# Patient Record
Sex: FEMALE | Race: WHITE | Employment: UNEMPLOYED | URBAN - METROPOLITAN AREA
[De-identification: names, ages, dates, MRNs, and addresses within clinical notes are randomized per-mention and may not be internally consistent; named-entity substitution may affect disease eponyms.]

---

## 2018-12-14 ENCOUNTER — APPOINTMENT (OUTPATIENT)
Dept: GENERAL RADIOLOGY | Age: 28
DRG: 071 | End: 2018-12-14
Payer: MEDICARE

## 2018-12-14 ENCOUNTER — HOSPITAL ENCOUNTER (INPATIENT)
Age: 28
LOS: 5 days | Discharge: HOME HEALTH | DRG: 071 | End: 2018-12-19
Attending: EMERGENCY MEDICINE | Admitting: HOSPITALIST
Payer: MEDICARE

## 2018-12-14 ENCOUNTER — APPOINTMENT (OUTPATIENT)
Dept: CT IMAGING | Age: 28
DRG: 071 | End: 2018-12-14
Payer: MEDICARE

## 2018-12-14 DIAGNOSIS — R90.89 ABNORMAL BRAIN CT: ICD-10-CM

## 2018-12-14 DIAGNOSIS — R41.82 ALTERED MENTAL STATUS, UNSPECIFIED ALTERED MENTAL STATUS TYPE: Primary | ICD-10-CM

## 2018-12-14 PROBLEM — G93.40 ACUTE ENCEPHALOPATHY: Status: ACTIVE | Noted: 2018-12-14

## 2018-12-14 LAB
A/G RATIO: 1.1 (ref 1.1–2.2)
ACETAMINOPHEN LEVEL: <5 UG/ML (ref 10–30)
ALBUMIN SERPL-MCNC: 4.3 G/DL (ref 3.4–5)
ALP BLD-CCNC: 86 U/L (ref 40–129)
ALT SERPL-CCNC: 24 U/L (ref 10–40)
AMMONIA: 67 UMOL/L (ref 11–51)
AMPHETAMINE SCREEN, URINE: NORMAL
ANION GAP SERPL CALCULATED.3IONS-SCNC: 12 MMOL/L (ref 3–16)
AST SERPL-CCNC: 16 U/L (ref 15–37)
BACTERIA: ABNORMAL /HPF
BARBITURATE SCREEN URINE: NORMAL
BASE EXCESS VENOUS: -5.1 MMOL/L (ref -3–3)
BASOPHILS ABSOLUTE: 0.1 K/UL (ref 0–0.2)
BASOPHILS RELATIVE PERCENT: 0.7 %
BENZODIAZEPINE SCREEN, URINE: NORMAL
BILIRUB SERPL-MCNC: <0.2 MG/DL (ref 0–1)
BILIRUBIN URINE: NEGATIVE
BLOOD, URINE: NEGATIVE
BUN BLDV-MCNC: 9 MG/DL (ref 7–20)
CALCIUM SERPL-MCNC: 9 MG/DL (ref 8.3–10.6)
CANNABINOID SCREEN URINE: NORMAL
CARBOXYHEMOGLOBIN: 2.5 % (ref 0–1.5)
CHLORIDE BLD-SCNC: 106 MMOL/L (ref 99–110)
CLARITY: CLEAR
CO2: 19 MMOL/L (ref 21–32)
COCAINE METABOLITE SCREEN URINE: NORMAL
COLOR: YELLOW
CREAT SERPL-MCNC: 0.8 MG/DL (ref 0.6–1.1)
D DIMER: <200 NG/ML DDU (ref 0–229)
EKG ATRIAL RATE: 111 BPM
EKG DIAGNOSIS: NORMAL
EKG P AXIS: 32 DEGREES
EKG P-R INTERVAL: 170 MS
EKG Q-T INTERVAL: 322 MS
EKG QRS DURATION: 82 MS
EKG QTC CALCULATION (BAZETT): 437 MS
EKG R AXIS: 23 DEGREES
EKG T AXIS: 52 DEGREES
EKG VENTRICULAR RATE: 111 BPM
EOSINOPHILS ABSOLUTE: 0.1 K/UL (ref 0–0.6)
EOSINOPHILS RELATIVE PERCENT: 0.4 %
EPITHELIAL CELLS, UA: 1 /HPF (ref 0–5)
ETHANOL: NORMAL MG/DL (ref 0–0.08)
GFR AFRICAN AMERICAN: >60
GFR NON-AFRICAN AMERICAN: >60
GLOBULIN: 3.9 G/DL
GLUCOSE BLD-MCNC: 122 MG/DL (ref 70–99)
GLUCOSE BLD-MCNC: 145 MG/DL (ref 70–99)
GLUCOSE BLD-MCNC: 93 MG/DL (ref 70–99)
GLUCOSE URINE: NEGATIVE MG/DL
HCG QUALITATIVE: NEGATIVE
HCO3 VENOUS: 19.9 MMOL/L (ref 23–29)
HCT VFR BLD CALC: 42.6 % (ref 36–48)
HEMOGLOBIN, VEN, REDUCED: 11 %
HEMOGLOBIN: 14.1 G/DL (ref 12–16)
HYALINE CASTS: 0 /LPF (ref 0–8)
INR BLD: 1.25 (ref 0.86–1.14)
KETONES, URINE: NEGATIVE MG/DL
LACTIC ACID: 1.3 MMOL/L (ref 0.4–2)
LEUKOCYTE ESTERASE, URINE: ABNORMAL
LITHIUM DOSE AMOUNT: ABNORMAL
LITHIUM LEVEL: <0.1 MMOL/L (ref 0.6–1.2)
LYMPHOCYTES ABSOLUTE: 2.6 K/UL (ref 1–5.1)
LYMPHOCYTES RELATIVE PERCENT: 18.7 %
Lab: NORMAL
MCH RBC QN AUTO: 29.1 PG (ref 26–34)
MCHC RBC AUTO-ENTMCNC: 33 G/DL (ref 31–36)
MCV RBC AUTO: 88.1 FL (ref 80–100)
METHADONE SCREEN, URINE: NORMAL
METHEMOGLOBIN VENOUS: 0.3 %
MICROSCOPIC EXAMINATION: YES
MONOCYTES ABSOLUTE: 0.9 K/UL (ref 0–1.3)
MONOCYTES RELATIVE PERCENT: 6.6 %
NEUTROPHILS ABSOLUTE: 10.1 K/UL (ref 1.7–7.7)
NEUTROPHILS RELATIVE PERCENT: 73.6 %
NITRITE, URINE: NEGATIVE
O2 CONTENT, VEN: 17 VOL %
O2 SAT, VEN: 89 %
O2 THERAPY: ABNORMAL
OPIATE SCREEN URINE: NORMAL
OXYCODONE URINE: NORMAL
PCO2, VEN: 36.2 MMHG (ref 40–50)
PDW BLD-RTO: 13.8 % (ref 12.4–15.4)
PERFORMED ON: ABNORMAL
PERFORMED ON: NORMAL
PH UA: 5
PH UA: 6
PH VENOUS: 7.35 (ref 7.35–7.45)
PHENCYCLIDINE SCREEN URINE: NORMAL
PLATELET # BLD: 254 K/UL (ref 135–450)
PMV BLD AUTO: 7.8 FL (ref 5–10.5)
PO2, VEN: 53.2 MMHG (ref 25–40)
POTASSIUM SERPL-SCNC: 4.2 MMOL/L (ref 3.5–5.1)
PRO-BNP: 7 PG/ML (ref 0–124)
PROPOXYPHENE SCREEN: NORMAL
PROTEIN UA: NEGATIVE MG/DL
PROTHROMBIN TIME: 14.2 SEC (ref 9.8–13)
RBC # BLD: 4.84 M/UL (ref 4–5.2)
RBC UA: 1 /HPF (ref 0–4)
SODIUM BLD-SCNC: 137 MMOL/L (ref 136–145)
SPECIFIC GRAVITY UA: 1.01
TCO2 CALC VENOUS: 47 MMOL/L
TOTAL PROTEIN: 8.2 G/DL (ref 6.4–8.2)
URINE REFLEX TO CULTURE: YES
URINE TYPE: ABNORMAL
UROBILINOGEN, URINE: 0.2 E.U./DL
VALPROIC ACID LEVEL: <2.8 UG/ML (ref 50–100)
WBC # BLD: 13.8 K/UL (ref 4–11)
WBC UA: 2 /HPF (ref 0–5)

## 2018-12-14 PROCEDURE — G0480 DRUG TEST DEF 1-7 CLASSES: HCPCS

## 2018-12-14 PROCEDURE — 2580000003 HC RX 258: Performed by: HOSPITALIST

## 2018-12-14 PROCEDURE — 87801 DETECT AGNT MULT DNA AMPLI: CPT

## 2018-12-14 PROCEDURE — 83880 ASSAY OF NATRIURETIC PEPTIDE: CPT

## 2018-12-14 PROCEDURE — 71045 X-RAY EXAM CHEST 1 VIEW: CPT

## 2018-12-14 PROCEDURE — 80053 COMPREHEN METABOLIC PANEL: CPT

## 2018-12-14 PROCEDURE — 96374 THER/PROPH/DIAG INJ IV PUSH: CPT

## 2018-12-14 PROCEDURE — 80164 ASSAY DIPROPYLACETIC ACD TOT: CPT

## 2018-12-14 PROCEDURE — 82803 BLOOD GASES ANY COMBINATION: CPT

## 2018-12-14 PROCEDURE — 83036 HEMOGLOBIN GLYCOSYLATED A1C: CPT

## 2018-12-14 PROCEDURE — 85610 PROTHROMBIN TIME: CPT

## 2018-12-14 PROCEDURE — 82140 ASSAY OF AMMONIA: CPT

## 2018-12-14 PROCEDURE — 93005 ELECTROCARDIOGRAM TRACING: CPT | Performed by: EMERGENCY MEDICINE

## 2018-12-14 PROCEDURE — 36415 COLL VENOUS BLD VENIPUNCTURE: CPT

## 2018-12-14 PROCEDURE — 81001 URINALYSIS AUTO W/SCOPE: CPT

## 2018-12-14 PROCEDURE — 84703 CHORIONIC GONADOTROPIN ASSAY: CPT

## 2018-12-14 PROCEDURE — 93005 ELECTROCARDIOGRAM TRACING: CPT | Performed by: INTERNAL MEDICINE

## 2018-12-14 PROCEDURE — 84145 PROCALCITONIN (PCT): CPT

## 2018-12-14 PROCEDURE — 80178 ASSAY OF LITHIUM: CPT

## 2018-12-14 PROCEDURE — 70450 CT HEAD/BRAIN W/O DYE: CPT

## 2018-12-14 PROCEDURE — 2580000003 HC RX 258: Performed by: EMERGENCY MEDICINE

## 2018-12-14 PROCEDURE — 96375 TX/PRO/DX INJ NEW DRUG ADDON: CPT

## 2018-12-14 PROCEDURE — 85025 COMPLETE CBC W/AUTO DIFF WBC: CPT

## 2018-12-14 PROCEDURE — 85379 FIBRIN DEGRADATION QUANT: CPT

## 2018-12-14 PROCEDURE — 87040 BLOOD CULTURE FOR BACTERIA: CPT

## 2018-12-14 PROCEDURE — 6370000000 HC RX 637 (ALT 250 FOR IP): Performed by: HOSPITALIST

## 2018-12-14 PROCEDURE — 99285 EMERGENCY DEPT VISIT HI MDM: CPT

## 2018-12-14 PROCEDURE — 80307 DRUG TEST PRSMV CHEM ANLYZR: CPT

## 2018-12-14 PROCEDURE — 2060000000 HC ICU INTERMEDIATE R&B

## 2018-12-14 PROCEDURE — 84443 ASSAY THYROID STIM HORMONE: CPT

## 2018-12-14 PROCEDURE — 87086 URINE CULTURE/COLONY COUNT: CPT

## 2018-12-14 PROCEDURE — 96361 HYDRATE IV INFUSION ADD-ON: CPT

## 2018-12-14 PROCEDURE — 83605 ASSAY OF LACTIC ACID: CPT

## 2018-12-14 PROCEDURE — 96372 THER/PROPH/DIAG INJ SC/IM: CPT

## 2018-12-14 PROCEDURE — 93010 ELECTROCARDIOGRAM REPORT: CPT | Performed by: INTERNAL MEDICINE

## 2018-12-14 RX ORDER — CLOZAPINE 100 MG/1
300 TABLET ORAL DAILY
Status: DISCONTINUED | OUTPATIENT
Start: 2018-12-14 | End: 2018-12-19 | Stop reason: HOSPADM

## 2018-12-14 RX ORDER — BENZTROPINE MESYLATE 1 MG/1
1 TABLET ORAL NIGHTLY
Status: DISCONTINUED | OUTPATIENT
Start: 2018-12-14 | End: 2018-12-19 | Stop reason: HOSPADM

## 2018-12-14 RX ORDER — DEXTROSE MONOHYDRATE 50 MG/ML
100 INJECTION, SOLUTION INTRAVENOUS PRN
Status: DISCONTINUED | OUTPATIENT
Start: 2018-12-14 | End: 2018-12-19 | Stop reason: HOSPADM

## 2018-12-14 RX ORDER — ONDANSETRON 2 MG/ML
4 INJECTION INTRAMUSCULAR; INTRAVENOUS EVERY 6 HOURS PRN
Status: DISCONTINUED | OUTPATIENT
Start: 2018-12-14 | End: 2018-12-19 | Stop reason: HOSPADM

## 2018-12-14 RX ORDER — ATORVASTATIN CALCIUM 10 MG/1
10 TABLET, FILM COATED ORAL DAILY
COMMUNITY

## 2018-12-14 RX ORDER — SODIUM CHLORIDE 0.9 % (FLUSH) 0.9 %
10 SYRINGE (ML) INJECTION PRN
Status: DISCONTINUED | OUTPATIENT
Start: 2018-12-14 | End: 2018-12-19 | Stop reason: HOSPADM

## 2018-12-14 RX ORDER — LEVOTHYROXINE SODIUM 0.07 MG/1
75 TABLET ORAL DAILY
COMMUNITY

## 2018-12-14 RX ORDER — YOHIMBE BARK 500 MG
1 CAPSULE ORAL DAILY
COMMUNITY

## 2018-12-14 RX ORDER — BENZTROPINE MESYLATE 1 MG/1
1 TABLET ORAL NIGHTLY
COMMUNITY

## 2018-12-14 RX ORDER — SODIUM CHLORIDE 0.9 % (FLUSH) 0.9 %
10 SYRINGE (ML) INJECTION EVERY 12 HOURS SCHEDULED
Status: DISCONTINUED | OUTPATIENT
Start: 2018-12-14 | End: 2018-12-19 | Stop reason: HOSPADM

## 2018-12-14 RX ORDER — TRAZODONE HYDROCHLORIDE 100 MG/1
100 TABLET ORAL NIGHTLY
COMMUNITY

## 2018-12-14 RX ORDER — CLINDAMYCIN PHOSPHATE 10 MG/G
GEL TOPICAL 2 TIMES DAILY
Status: ON HOLD | COMMUNITY
End: 2018-12-19 | Stop reason: HOSPADM

## 2018-12-14 RX ORDER — GELATIN CAPSULES (EMPTY)
1 CAPSULE ORAL PRN
Status: DISCONTINUED | OUTPATIENT
Start: 2018-12-14 | End: 2018-12-19 | Stop reason: HOSPADM

## 2018-12-14 RX ORDER — ATORVASTATIN CALCIUM 10 MG/1
10 TABLET, FILM COATED ORAL DAILY
Status: DISCONTINUED | OUTPATIENT
Start: 2018-12-15 | End: 2018-12-19 | Stop reason: HOSPADM

## 2018-12-14 RX ORDER — NICOTINE POLACRILEX 4 MG
15 LOZENGE BUCCAL PRN
Status: DISCONTINUED | OUTPATIENT
Start: 2018-12-14 | End: 2018-12-19 | Stop reason: HOSPADM

## 2018-12-14 RX ORDER — 0.9 % SODIUM CHLORIDE 0.9 %
1000 INTRAVENOUS SOLUTION INTRAVENOUS ONCE
Status: COMPLETED | OUTPATIENT
Start: 2018-12-14 | End: 2018-12-14

## 2018-12-14 RX ORDER — DEXTROSE MONOHYDRATE 25 G/50ML
12.5 INJECTION, SOLUTION INTRAVENOUS PRN
Status: DISCONTINUED | OUTPATIENT
Start: 2018-12-14 | End: 2018-12-19 | Stop reason: HOSPADM

## 2018-12-14 RX ORDER — TOPIRAMATE 100 MG/1
100 TABLET, FILM COATED ORAL 2 TIMES DAILY
Status: DISCONTINUED | OUTPATIENT
Start: 2018-12-14 | End: 2018-12-19 | Stop reason: HOSPADM

## 2018-12-14 RX ORDER — FLUPHENAZINE DECANOATE 25 MG/ML
62.5 INJECTION, SOLUTION INTRAMUSCULAR; SUBCUTANEOUS
COMMUNITY

## 2018-12-14 RX ORDER — LEVOTHYROXINE SODIUM 0.07 MG/1
75 TABLET ORAL DAILY
Status: DISCONTINUED | OUTPATIENT
Start: 2018-12-15 | End: 2018-12-19 | Stop reason: HOSPADM

## 2018-12-14 RX ADMIN — BENZTROPINE MESYLATE 1 MG: 1 TABLET ORAL at 20:47

## 2018-12-14 RX ADMIN — Medication 10 ML: at 20:48

## 2018-12-14 RX ADMIN — INSULIN LISPRO 1 UNITS: 100 INJECTION, SOLUTION INTRAVENOUS; SUBCUTANEOUS at 21:31

## 2018-12-14 RX ADMIN — CLOZAPINE 300 MG: 100 TABLET ORAL at 20:47

## 2018-12-14 RX ADMIN — TOPIRAMATE 100 MG: 100 TABLET, FILM COATED ORAL at 20:47

## 2018-12-14 RX ADMIN — SODIUM CHLORIDE 1000 ML: 9 INJECTION, SOLUTION INTRAVENOUS at 15:47

## 2018-12-14 RX ADMIN — RIVAROXABAN 20 MG: 20 TABLET, FILM COATED ORAL at 20:47

## 2018-12-14 ASSESSMENT — PAIN DESCRIPTION - LOCATION
LOCATION: CHEST

## 2018-12-14 ASSESSMENT — PAIN SCALES - GENERAL
PAINLEVEL_OUTOF10: 9
PAINLEVEL_OUTOF10: 5
PAINLEVEL_OUTOF10: 4
PAINLEVEL_OUTOF10: 0

## 2018-12-14 ASSESSMENT — PAIN DESCRIPTION - PAIN TYPE
TYPE: ACUTE PAIN

## 2018-12-14 ASSESSMENT — PAIN DESCRIPTION - ORIENTATION: ORIENTATION: MID

## 2018-12-14 NOTE — ED NOTES
Attempted PIV x2 and was unsuccessful. Another RN notified to attempt.        Shahab Williamson RN  12/14/18 6839

## 2018-12-14 NOTE — ED PROVIDER NOTES
[facility] Emergency Department      Pt Name: Barrie Schwartz  MRN: [id number]  [place] 1990  Date of evaluation: 12/14/2018  Provider: Galindo Miranda MD  CHIEF COMPLAINT  Chief Complaint   Patient presents with    Altered Mental Status     Pt. comes in by [facility] from a group home for altered mental status. Pt. reports she has had diarrhea the last few days, burning when she pees, and frequency. Pt. also has a history of diabetes. Pt. alert and oriented, but slow to respond. HPI  Barrie Schwartz is a 33 y.o. female who presents because of altered mental state. She does have a history of mental illness and lives in a group home with a history of mild MR. She was at work today when she was sent here by EMS. She denies any overdose. She denies any recent illness. However, she is currently a poor historian and does not seem to be able to effectively answer questions with reliable accuracy. She told the nurse that she has burning when she urinates with frequency. She agrees that she has noticed that. She mentioned chest discomfort to the nurse. She does not mention that to me. She denies any alcohol use or use of illicit substances. She is on multiple medicines that could have a sedative effect. She reports having diarrhea for about 2 months. REVIEW OF SYSTEMS:  See HPI for further details. Remainder of review of systems limited by patient ability to provide history. Nursing notes reviewed.     PAST MEDICAL HISTORY  Past Medical History:   Diagnosis Date    Arthritis     R wrist    Asthma     Bipolar affective disorder ([address]     Blood circulation, collateral     Borderline personality disorder ([address]     Diabetes mellitus ([address]     GERD (gastroesophageal reflux disease)     Mental retardation     Obesity     Other disorders of kidney and ureter     Pulmonary embolism (HCC)     Seizures (HCC)     last sz 2 wks ago    Thyroid disease      SURGICAL °C), temperature source Oral, resp. rate 20, height 5' 9\" (1.753 m), weight 300 lb (136.1 kg), SpO2 97 %. Constitutional:  33 y.o. female eyes open, appears to be altered but will answer questions slowly  HENT:  Atraumatic, mucous membranes moist  Eyes:   Conjunctiva clear, no icterus  Neck:  Supple, no JVD, no signs of injury  Cardiovascular:  Regular, no rubs, no discernible murmur, mild tachycardia  Thorax & Lungs:  No accessory muscle usage, clear  Abdomen:  Soft, non distended, bowel sounds present, NT  Back:  No deformity  Genitalia:  Deferred  Rectal:  Deferred  Extremities:  No cyanosis, no edema  Skin:  Warm, dry  Neurologic:  Alert, no slurred speech, no focal deficits noted, light touch sensation intact  Psychiatric:  Affect appropriate    DIAGNOSTIC RESULTS:  Labs resulted at the time of this note reviewed.   Labs Reviewed   CBC WITH AUTO DIFFERENTIAL - Abnormal; Notable for the following:        Result Value    WBC 13.8 (*)     Neutrophils # 10.1 (*)     All other components within normal limits    Narrative:     Performed at:  [facility] [address], [place], [address]   Phone [phone]   COMPREHENSIVE METABOLIC PANEL - Abnormal; Notable for the following:     CO2 19 (*)     Glucose 122 (*)     All other components within normal limits    Narrative:     Performed at:  [facility] [address], [place], [facility] [employer] [address]   Phone [phone]   LITHIUM LEVEL - Abnormal; Notable for the following:     Lithium Lvl <0.1 (*)     All other components within normal limits    Narrative:     Performed at:  [facility] [address], [place], [facility] [employer] [address]   Phone [phone]   BLOOD GAS, VENOUS - Abnormal; Notable for the following:     pH, Amrik 7.348 (*)     pCO2, Amrik 36.2 (*)     pO2, Amrik 53.2 (*)     HCO3, Venous 19.9 (*)     Base Excess, Amrik -5.1 (*)     Carboxyhemoglobin 2.5 (*)     All other components within normal limits    Narrative:     Performed at:  [facility] [address], [facility], [address]   Phone [phone]   ACETAMINOPHEN LEVEL - Abnormal; Notable for the following:     Acetaminophen Level <5 (*)     All other components within normal limits    Narrative:     Performed at:  [facility] [address], [facility], [address] [employer]   Phone [phone]   VALPROIC ACID LEVEL, TOTAL - Abnormal; Notable for the following:     Valproic Acid Lvl <2.8 (*)     All other components within normal limits    Narrative:     Performed at:  [facility] [address], [facility], [address] [employer]   Phone [phone]   PROTIME-INR - Abnormal; Notable for the following:     Protime 14.2 (*)     INR 1.25 (*)     All other components within normal limits    Narrative:     Performed at:  [facility] [address], [facility], [address]   Phone [phone]   URINE RT REFLEX TO CULTURE - Abnormal; Notable for the following:     Leukocyte Esterase, Urine TRACE (*)     All other components within normal limits    Narrative:     Performed at:  [facility] [address], [facility], [address] [employer]   Phone [phone]   MICROSCOPIC URINALYSIS - Abnormal; Notable for the following:     Bacteria, UA RARE (*)     All other components within normal limits    Narrative:     Performed at:  [facility] [address], [facility], [address]   Phone [phone]   CULTURE BLOOD #1   CULTURE BLOOD #2   URINE CULTURE   LACTIC ACID, PLASMA    Narrative:     Performed at:  [facility] [address], [facility], [facility] [address]   Phone [phone]   ETHANOL    Narrative:     Performed at:  [facility] [address], [facility], [facility] [employer]   Phone [phone]   URINE DRUG SCREEN    Narrative:     Performed at:  [facility]  [address] [place]  [place], [address] [facility]   Phone [phone]   HCG, SERUM, QUALITATIVE    Narrative:     Performed at:  [facility]  [address] [place],  [place], [address]   Phone [phone]     EKG:  Read by me in the absence of a cardiologist shows:  Sinus rhythm, normal rate, normal conduction intervals, normal axis, no acute injury pattern, no major change from prior study      RADIOLOGY:  Plain x-rays were viewed by me: Ct Head Wo Contrast    Result Date: 12/14/2018  EXAMINATION: CT OF THE HEAD WITHOUT CONTRAST  12/14/2018 3:33 pm TECHNIQUE: CT of the head was performed without the administration of intravenous contrast. Dose modulation, iterative reconstruction, and/or weight based adjustment of the mA/kV was utilized to reduce the radiation dose to as low as reasonably achievable. COMPARISON: 01/23/2012 HISTORY: ORDERING SYSTEM PROVIDED HISTORY: mental status change TECHNOLOGIST PROVIDED HISTORY: Has a \"code stroke\" or \"stroke alert\" been called? ->No Ordering Physician Provided Reason for Exam: Altered Mental Status Acuity: Unknown Type of Exam: Unknown 53-year-old female with altered mental status FINDINGS: BRAIN/VENTRICLES: The foramen magnum and 4th ventricles appear patent. The ventricles are normal in size and midline in position. The sulci, cisterns, and extra-axial spaces are grossly unremarkable in appearance. No abnormal extra-axial fluid collections are identified. There is no clear evidence for acute intracranial hemorrhage, mass, mass effect, or midline shift. Mild periventricular and subcortical white matter hypoattenuation which is advanced and nonspecific in a patient of this age. ORBITS: The visualized portion of the orbits demonstrate no acute abnormality. SINUSES: The visualized paranasal sinuses and mastoid air cells demonstrate no acute abnormality.  SOFT

## 2018-12-14 NOTE — ED NOTES
Bed: 08  Expected date:   Expected time:   Means of arrival: [facility]  Comments:   [facility]  12/14/18 9201

## 2018-12-14 NOTE — H&P
Borderline personality disorder ([facility] Diabetes mellitus ([facility] GERD (gastroesophageal reflux disease); Mental retardation; Obesity; Other disorders of kidney and ureter; Pulmonary embolism ([facility] Seizures ([facility] and Thyroid disease. Past Surgical History:   has a past surgical history that includes Ellis tooth extraction; Carpal tunnel release (Right); Breast surgery (9/23/14); and Pilonidal cyst excision (9/23/14). Medications:  No current facility-administered medications on file prior to encounter. Current Outpatient Prescriptions on File Prior to Encounter   Medication Sig Dispense Refill    cloZAPine (CLOZARIL) 100 MG tablet Take 300 mg by mouth daily       lisinopril (PRINIVIL;ZESTRIL) 20 MG tablet Take 20 mg by mouth daily      hydrOXYzine (VISTARIL) 50 MG capsule Take 50 mg by mouth 3 times daily as needed for Itching      Liraglutide (VICTOZA) 18 MG/3ML SOPN SC injection Take 1.2 mg by mouth daily       rivaroxaban (XARELTO) 20 MG TABS tablet Take 20 mg by mouth Daily with supper       sitaGLIPtin (JANUVIA) 100 MG tablet Take 100 mg by mouth daily      topiramate (TOPAMAX) 25 MG tablet Take 100 mg by mouth 2 times daily       mupirocin (BACTROBAN) 2 % ointment Apply topically 3 times daily Apply topically 3 times daily.  LORazepam (ATIVAN) 1 MG tablet Take 1 mg by mouth daily. Melissa Sawyer folic acid (FOLVITE) 1 MG tablet Take 3 mg by mouth daily       metformin (GLUCOPHAGE-XR) 500 MG XR tablet Take 2 tablets by mouth 2 times daily (with meals) for 30 days. 120 tablet 0       Allergies: Allergies   Allergen Reactions    Other      Possible anesthesia reaction, agent unknown        Social History:   reports that she has quit smoking. She quit after 1.00 year of use. She has never used smokeless tobacco. She reports that she uses drugs. She reports that she does not drink alcohol.      Family History:  family history includes Arthritis in her mother; Cancer in her maternal

## 2018-12-15 ENCOUNTER — APPOINTMENT (OUTPATIENT)
Dept: MRI IMAGING | Age: 28
DRG: 071 | End: 2018-12-15
Payer: MEDICARE

## 2018-12-15 LAB
EKG ATRIAL RATE: 110 BPM
EKG DIAGNOSIS: NORMAL
EKG P AXIS: 43 DEGREES
EKG P-R INTERVAL: 170 MS
EKG Q-T INTERVAL: 336 MS
EKG QRS DURATION: 84 MS
EKG QTC CALCULATION (BAZETT): 454 MS
EKG R AXIS: 31 DEGREES
EKG T AXIS: 70 DEGREES
EKG VENTRICULAR RATE: 110 BPM
ESTIMATED AVERAGE GLUCOSE: 114 MG/DL
GLUCOSE BLD-MCNC: 101 MG/DL (ref 70–99)
GLUCOSE BLD-MCNC: 108 MG/DL (ref 70–99)
GLUCOSE BLD-MCNC: 112 MG/DL (ref 70–99)
GLUCOSE BLD-MCNC: 117 MG/DL (ref 70–99)
GLUCOSE BLD-MCNC: 130 MG/DL (ref 70–99)
HBA1C MFR BLD: 5.6 %
PERFORMED ON: ABNORMAL
PROCALCITONIN: 0.02 NG/ML (ref 0–0.15)
TSH REFLEX: 1.32 UIU/ML (ref 0.27–4.2)
URINE CULTURE, ROUTINE: NORMAL

## 2018-12-15 PROCEDURE — 93010 ELECTROCARDIOGRAM REPORT: CPT | Performed by: INTERNAL MEDICINE

## 2018-12-15 PROCEDURE — 2580000003 HC RX 258: Performed by: HOSPITALIST

## 2018-12-15 PROCEDURE — 6370000000 HC RX 637 (ALT 250 FOR IP): Performed by: HOSPITALIST

## 2018-12-15 PROCEDURE — 2060000000 HC ICU INTERMEDIATE R&B

## 2018-12-15 PROCEDURE — 70551 MRI BRAIN STEM W/O DYE: CPT

## 2018-12-15 PROCEDURE — 99223 1ST HOSP IP/OBS HIGH 75: CPT | Performed by: PSYCHIATRY & NEUROLOGY

## 2018-12-15 RX ADMIN — ATORVASTATIN CALCIUM 10 MG: 10 TABLET, FILM COATED ORAL at 10:10

## 2018-12-15 RX ADMIN — TOPIRAMATE 100 MG: 100 TABLET, FILM COATED ORAL at 10:09

## 2018-12-15 RX ADMIN — BENZTROPINE MESYLATE 1 MG: 1 TABLET ORAL at 23:00

## 2018-12-15 RX ADMIN — LEVOTHYROXINE SODIUM 75 MCG: 75 TABLET ORAL at 06:54

## 2018-12-15 RX ADMIN — TOPIRAMATE 100 MG: 100 TABLET, FILM COATED ORAL at 23:00

## 2018-12-15 RX ADMIN — Medication 10 ML: at 23:01

## 2018-12-15 RX ADMIN — Medication 10 ML: at 10:10

## 2018-12-15 RX ADMIN — RIVAROXABAN 20 MG: 20 TABLET, FILM COATED ORAL at 23:03

## 2018-12-15 RX ADMIN — CLOZAPINE 300 MG: 100 TABLET ORAL at 10:15

## 2018-12-15 ASSESSMENT — PAIN SCALES - WONG BAKER
WONGBAKER_NUMERICALRESPONSE: 0
WONGBAKER_NUMERICALRESPONSE: 0

## 2018-12-15 ASSESSMENT — PAIN SCALES - GENERAL
PAINLEVEL_OUTOF10: 0
PAINLEVEL_OUTOF10: 0

## 2018-12-15 NOTE — CODE DOCUMENTATION
Pt confused, want sto know why she is here and asking why we are we calling her parents. Dr Mac Mosley at bedside.

## 2018-12-15 NOTE — PROGRESS NOTES
List  Active Problems:    Diabetes mellitus ([facility]    Mental retardation    Bipolar affective disorder ([facility]    Hypothyroidism    PTSD (post-traumatic stress disorder)    Acute encephalopathy  Resolved Problems:    * No resolved hospital problems. *       Assessment & Plan:   Acute encephalopathy  - still with unclear reason  - had another episode last night. With return to normal  - possible seizure vs CVA. - no signs of acute infection otherwise  - currently she seems to be back to normal  - we will get EEG as well  - mri is pending.   - neurology recs pending as well      IV Access:   Chong:  Diet: DIET GENERAL;  Code:Full Code  DVT PPX  Disposition pending [facility] testing      Chrissy Mckenzie MD   12/15/2018 10:25 AM

## 2018-12-15 NOTE — CODE DOCUMENTATION
Pt c/o confusion and dizziness. Denies cough congestion runny nose. Pt states room is spinning. Pt feel slike she felt last time she had blood clots. Pt has had diarrhea for a few weeks.

## 2018-12-15 NOTE — CONSULTS
in the distal lower extremities. Coordination:  Normal in the arms and difficult to test in the legs  Reflexes:  1 in the upper extremities and barely elicitable in the lower extremities  Plantar response: Flexor response bilaterally  Gait: Unable to ambulate at this time because of dizziness  Romberg: Could not be tested  Vascular: No carotid bruit bilaterally        DATA:  LABS:  General Labs:    CBC:   Lab Results   Component Value Date    WBC 13.8 12/14/2018    RBC 4.84 12/14/2018    HGB 14.1 12/14/2018    HCT 42.6 12/14/2018    MCV 88.1 12/14/2018    MCH 29.1 12/14/2018    MCHC 33.0 12/14/2018    RDW 13.8 12/14/2018     12/14/2018    MPV 7.8 12/14/2018     BMP:    Lab Results   Component Value Date     12/14/2018    K 4.2 12/14/2018    K 3.9 05/10/2018     12/14/2018    CO2 19 12/14/2018    BUN 9 12/14/2018    LABALBU 4.3 12/14/2018    CREATININE 0.8 12/14/2018    CALCIUM 9.0 12/14/2018    GFRAA >60 12/14/2018    GFRAA >60 09/23/2012    LABGLOM >60 12/14/2018    GLUCOSE 122 12/14/2018     RADIOLOGY REVIEW:  I have reviewed radiology image(s) and reports(s) of:  CT scan of the head    IMPRESSION :  Spell of unresponsiveness,? Seizure  The patient states that she has never had seizures in the past but on the past medical history noted in the chart there is mention of seizure disorder.   Patient states that she takes the topiramate for her psychiatric problems  There is history of bipolar disorder as well as posttraumatic stress disorder  Diabetic neuropathy  CT head shows chronic white matter changes, rule out demyelinating disease  Patient Active Problem List   Diagnosis    Diabetes mellitus ([address]    Mental retardation    Bipolar affective disorder ([address]    Hypothyroidism    Bipolar 1 disorder, depressed ([address]    Anorexia nervosa    PTSD (post-traumatic stress disorder)    Acute encephalopathy     RECOMMENDATIONS :  Discussed with patient  I will get an EEG  Await MRI brain  Thank you

## 2018-12-16 LAB
GLUCOSE BLD-MCNC: 122 MG/DL (ref 70–99)
GLUCOSE BLD-MCNC: 132 MG/DL (ref 70–99)
GLUCOSE BLD-MCNC: 132 MG/DL (ref 70–99)
GLUCOSE BLD-MCNC: 167 MG/DL (ref 70–99)
PERFORMED ON: ABNORMAL

## 2018-12-16 PROCEDURE — 6370000000 HC RX 637 (ALT 250 FOR IP): Performed by: INTERNAL MEDICINE

## 2018-12-16 PROCEDURE — 6370000000 HC RX 637 (ALT 250 FOR IP): Performed by: NURSE PRACTITIONER

## 2018-12-16 PROCEDURE — 99232 SBSQ HOSP IP/OBS MODERATE 35: CPT | Performed by: PSYCHIATRY & NEUROLOGY

## 2018-12-16 PROCEDURE — 6370000000 HC RX 637 (ALT 250 FOR IP): Performed by: HOSPITALIST

## 2018-12-16 PROCEDURE — 2060000000 HC ICU INTERMEDIATE R&B

## 2018-12-16 PROCEDURE — 2580000003 HC RX 258: Performed by: HOSPITALIST

## 2018-12-16 PROCEDURE — 2580000003 HC RX 258: Performed by: INTERNAL MEDICINE

## 2018-12-16 PROCEDURE — 6360000002 HC RX W HCPCS: Performed by: INTERNAL MEDICINE

## 2018-12-16 RX ORDER — LORAZEPAM 1 MG/1
1 TABLET ORAL DAILY PRN
Status: DISCONTINUED | OUTPATIENT
Start: 2018-12-16 | End: 2018-12-19 | Stop reason: HOSPADM

## 2018-12-16 RX ORDER — ACETAMINOPHEN 325 MG/1
650 TABLET ORAL EVERY 4 HOURS PRN
Status: DISCONTINUED | OUTPATIENT
Start: 2018-12-16 | End: 2018-12-19 | Stop reason: HOSPADM

## 2018-12-16 RX ORDER — SODIUM CHLORIDE 9 MG/ML
INJECTION, SOLUTION INTRAVENOUS CONTINUOUS
Status: DISCONTINUED | OUTPATIENT
Start: 2018-12-16 | End: 2018-12-17

## 2018-12-16 RX ADMIN — CLOZAPINE 300 MG: 100 TABLET ORAL at 09:16

## 2018-12-16 RX ADMIN — VANCOMYCIN HYDROCHLORIDE 1750 MG: 10 INJECTION, POWDER, LYOPHILIZED, FOR SOLUTION INTRAVENOUS at 22:55

## 2018-12-16 RX ADMIN — LORAZEPAM 1 MG: 1 TABLET ORAL at 20:48

## 2018-12-16 RX ADMIN — LEVOTHYROXINE SODIUM 75 MCG: 75 TABLET ORAL at 05:37

## 2018-12-16 RX ADMIN — VANCOMYCIN HYDROCHLORIDE 1750 MG: 10 INJECTION, POWDER, LYOPHILIZED, FOR SOLUTION INTRAVENOUS at 10:32

## 2018-12-16 RX ADMIN — INSULIN LISPRO 1 UNITS: 100 INJECTION, SOLUTION INTRAVENOUS; SUBCUTANEOUS at 20:53

## 2018-12-16 RX ADMIN — RIVAROXABAN 20 MG: 20 TABLET, FILM COATED ORAL at 16:58

## 2018-12-16 RX ADMIN — SODIUM CHLORIDE: 9 INJECTION, SOLUTION INTRAVENOUS at 10:32

## 2018-12-16 RX ADMIN — ACETAMINOPHEN 650 MG: 325 TABLET, FILM COATED ORAL at 15:45

## 2018-12-16 RX ADMIN — BENZTROPINE MESYLATE 1 MG: 1 TABLET ORAL at 20:48

## 2018-12-16 RX ADMIN — Medication 10 ML: at 09:17

## 2018-12-16 RX ADMIN — TOPIRAMATE 100 MG: 100 TABLET, FILM COATED ORAL at 20:48

## 2018-12-16 RX ADMIN — ATORVASTATIN CALCIUM 10 MG: 10 TABLET, FILM COATED ORAL at 09:16

## 2018-12-16 RX ADMIN — TOPIRAMATE 100 MG: 100 TABLET, FILM COATED ORAL at 09:16

## 2018-12-16 RX ADMIN — ACETAMINOPHEN 650 MG: 325 TABLET, FILM COATED ORAL at 22:55

## 2018-12-16 ASSESSMENT — PAIN DESCRIPTION - PAIN TYPE: TYPE: ACUTE PAIN

## 2018-12-16 ASSESSMENT — PAIN DESCRIPTION - LOCATION: LOCATION: HEAD

## 2018-12-16 ASSESSMENT — PAIN SCALES - GENERAL
PAINLEVEL_OUTOF10: 5
PAINLEVEL_OUTOF10: 2
PAINLEVEL_OUTOF10: 0

## 2018-12-16 ASSESSMENT — PAIN DESCRIPTION - FREQUENCY: FREQUENCY: INTERMITTENT

## 2018-12-16 ASSESSMENT — PAIN DESCRIPTION - DESCRIPTORS: DESCRIPTORS: ACHING;HEADACHE

## 2018-12-16 ASSESSMENT — PAIN DESCRIPTION - ORIENTATION: ORIENTATION: ANTERIOR

## 2018-12-16 NOTE — PROGRESS NOTES
Acute encephalopathy       RECOMMENDATIONS :  Discussed with patient  Explained normal MRI brain results  Repeat ammonia level tomorrow  Await EEG to be done tomorrow        Please note a portion of this chart was generated using dragon dictation software. Although every effort was made to ensure the accuracy of this automated transcription, some errors in transcription may have occurred.          Mariano Germain M.D.

## 2018-12-17 LAB
AMMONIA: 52 UMOL/L (ref 11–51)
BASOPHILS ABSOLUTE: 0.1 K/UL (ref 0–0.2)
BASOPHILS RELATIVE PERCENT: 0.5 %
EOSINOPHILS ABSOLUTE: 0.3 K/UL (ref 0–0.6)
EOSINOPHILS RELATIVE PERCENT: 2 %
GLUCOSE BLD-MCNC: 125 MG/DL (ref 70–99)
GLUCOSE BLD-MCNC: 141 MG/DL (ref 70–99)
GLUCOSE BLD-MCNC: 154 MG/DL (ref 70–99)
GLUCOSE BLD-MCNC: 98 MG/DL (ref 70–99)
HCT VFR BLD CALC: 41.2 % (ref 36–48)
HEMOGLOBIN: 13.4 G/DL (ref 12–16)
LYMPHOCYTES ABSOLUTE: 4.4 K/UL (ref 1–5.1)
LYMPHOCYTES RELATIVE PERCENT: 33.4 %
MCH RBC QN AUTO: 28.8 PG (ref 26–34)
MCHC RBC AUTO-ENTMCNC: 32.6 G/DL (ref 31–36)
MCV RBC AUTO: 88.4 FL (ref 80–100)
MONOCYTES ABSOLUTE: 0.9 K/UL (ref 0–1.3)
MONOCYTES RELATIVE PERCENT: 7 %
NEUTROPHILS ABSOLUTE: 7.4 K/UL (ref 1.7–7.7)
NEUTROPHILS RELATIVE PERCENT: 57.1 %
PDW BLD-RTO: 13.9 % (ref 12.4–15.4)
PERFORMED ON: ABNORMAL
PERFORMED ON: NORMAL
PLATELET # BLD: 231 K/UL (ref 135–450)
PMV BLD AUTO: 7.7 FL (ref 5–10.5)
RBC # BLD: 4.66 M/UL (ref 4–5.2)
WBC # BLD: 13 K/UL (ref 4–11)

## 2018-12-17 PROCEDURE — 99232 SBSQ HOSP IP/OBS MODERATE 35: CPT | Performed by: PSYCHIATRY & NEUROLOGY

## 2018-12-17 PROCEDURE — 82140 ASSAY OF AMMONIA: CPT

## 2018-12-17 PROCEDURE — 2580000003 HC RX 258: Performed by: INTERNAL MEDICINE

## 2018-12-17 PROCEDURE — 36415 COLL VENOUS BLD VENIPUNCTURE: CPT

## 2018-12-17 PROCEDURE — 2580000003 HC RX 258: Performed by: HOSPITALIST

## 2018-12-17 PROCEDURE — 6370000000 HC RX 637 (ALT 250 FOR IP): Performed by: NURSE PRACTITIONER

## 2018-12-17 PROCEDURE — 6370000000 HC RX 637 (ALT 250 FOR IP): Performed by: HOSPITALIST

## 2018-12-17 PROCEDURE — 85025 COMPLETE CBC W/AUTO DIFF WBC: CPT

## 2018-12-17 PROCEDURE — 95819 EEG AWAKE AND ASLEEP: CPT | Performed by: PSYCHIATRY & NEUROLOGY

## 2018-12-17 PROCEDURE — 95819 EEG AWAKE AND ASLEEP: CPT

## 2018-12-17 PROCEDURE — 6360000002 HC RX W HCPCS: Performed by: INTERNAL MEDICINE

## 2018-12-17 PROCEDURE — 2060000000 HC ICU INTERMEDIATE R&B

## 2018-12-17 RX ORDER — GUAIFENESIN/DEXTROMETHORPHAN 100-10MG/5
5 SYRUP ORAL EVERY 4 HOURS PRN
Status: DISCONTINUED | OUTPATIENT
Start: 2018-12-17 | End: 2018-12-19 | Stop reason: HOSPADM

## 2018-12-17 RX ADMIN — TOPIRAMATE 100 MG: 100 TABLET, FILM COATED ORAL at 21:44

## 2018-12-17 RX ADMIN — Medication 10 ML: at 21:45

## 2018-12-17 RX ADMIN — RIVAROXABAN 20 MG: 20 TABLET, FILM COATED ORAL at 18:11

## 2018-12-17 RX ADMIN — ATORVASTATIN CALCIUM 10 MG: 10 TABLET, FILM COATED ORAL at 08:35

## 2018-12-17 RX ADMIN — LORAZEPAM 1 MG: 1 TABLET ORAL at 21:44

## 2018-12-17 RX ADMIN — LEVOTHYROXINE SODIUM 75 MCG: 75 TABLET ORAL at 08:35

## 2018-12-17 RX ADMIN — DEXTROSE MONOHYDRATE 100 ML/HR: 50 INJECTION, SOLUTION INTRAVENOUS at 10:57

## 2018-12-17 RX ADMIN — TOPIRAMATE 100 MG: 100 TABLET, FILM COATED ORAL at 08:35

## 2018-12-17 RX ADMIN — CLOZAPINE 300 MG: 100 TABLET ORAL at 08:35

## 2018-12-17 RX ADMIN — DEXTROSE MONOHYDRATE 100 ML/HR: 50 INJECTION, SOLUTION INTRAVENOUS at 10:53

## 2018-12-17 RX ADMIN — BENZTROPINE MESYLATE 1 MG: 1 TABLET ORAL at 21:45

## 2018-12-17 RX ADMIN — VANCOMYCIN HYDROCHLORIDE 1750 MG: 10 INJECTION, POWDER, LYOPHILIZED, FOR SOLUTION INTRAVENOUS at 18:12

## 2018-12-17 RX ADMIN — INSULIN LISPRO 1 UNITS: 100 INJECTION, SOLUTION INTRAVENOUS; SUBCUTANEOUS at 21:45

## 2018-12-17 NOTE — PROGRESS NOTES
NEUROLOGY FOLLOWUP    HISTORY OF PRESENT ILLNESS :    Matthias Franco is a 33 y.o. female   History was obtained from the patient and dictations in the chart. Patient denies any significant dizziness at this time. He is feeling better.   Blood cultures were positive and the patient is now on IV antibiotics  Denies any headache focal weakness numbness vertigo or diplopia    REVIEW OF SYSTEMS    Constitutional:  []   Chills   []  Fatigue   []  Fevers   []  Malaise   []  Weight loss     [x] Denies all of the above    Respiratory:   []  Cough    []  Shortness of breath         [x] Denies all of the above     Cardiovascular:   []  Chest pain    []  Exertional chest pressure/discomfort           [] Palpitations    []  Syncope     [x] Denies all of the above    Past Medical History:   Diagnosis Date    Arthritis     R wrist    Asthma     Bipolar affective disorder ([facility]     Blood circulation, collateral     Borderline personality disorder ([facility]     Diabetes mellitus ([facility]     GERD (gastroesophageal reflux disease)     Mental retardation     Obesity     Other disorders of kidney and ureter     Pulmonary embolism (HCC)     Seizures ([facility]     last sz 2 wks ago    Thyroid disease      Family History   Problem Relation Age of Onset    Arthritis Mother     Depression Mother     High Blood Pressure Mother     Mental Illness Mother     Mental Illness Father     Substance Abuse Father     Cancer Maternal Grandmother      Social History     Social History    Marital status: Single     Spouse name: N/A    Number of children: N/A    Years of education: N/A     Social History Main Topics    Smoking status: Former Smoker     Years: 1.00    Smokeless tobacco: Never Used    Alcohol use No      Comment: states no alcohol in years     Drug use: Yes      Comment: states last drug use 1 year ago  pot and pain pills off street

## 2018-12-17 NOTE — PROGRESS NOTES
Assessment complete, see documentation. VSS. Pt c/o of being anxious requesting her daily ativan. POC reviewed. Explained to pt purpose of isolation and preventing futher infection. Explained purpose and side effects of vancomycin. SBA pt to bathroom. Pt up in chair. Pt denies any other needs at this time. Fall precautions in place, call light, bedside table and phone within reach, will continue to monitor.

## 2018-12-17 NOTE — PROGRESS NOTES
[facility] PROGRESS NOTE    12/17/2018 4:26 PM        Name: Marah Peters . Admitted: 12/14/2018  Primary Care Provider: DRAKE MINOR (Tel: None)    Brief Course:   Admitted with encephalopathy      Subjective: Mental status returned to baseline    Reviewed interval ancillary notes    Current Medications    guaiFENesin-dextromethorphan (ROBITUSSIN DM) 100-10 MG/5ML syrup 5 mL Q4H PRN   vancomycin (VANCOCIN) 1,750 mg in dextrose 5 % 500 mL IVPB Q12H   0.9 % sodium chloride infusion Continuous   acetaminophen (TYLENOL) tablet 650 mg Q4H PRN   LORazepam (ATIVAN) tablet 1 mg Daily PRN   atorvastatin (LIPITOR) tablet 10 mg Daily   benztropine (COGENTIN) tablet 1 mg Nightly   cloZAPine (CLOZARIL) tablet 300 mg Daily   levothyroxine (SYNTHROID) tablet 75 mcg Daily   Liraglutide (VICTOZA) SC injection 1.2 mg PATIENT SUPPLIED Daily   rivaroxaban (XARELTO) tablet 20 mg Dinner   topiramate (TOPAMAX) tablet 100 mg BID   sodium chloride flush 0.9 % injection 10 mL 2 times per day   sodium chloride flush 0.9 % injection 10 mL PRN   magnesium hydroxide (MILK OF MAGNESIA) 400 MG/5ML suspension 30 mL Daily PRN   ondansetron (ZOFRAN) injection 4 mg Q6H PRN   glucose (GLUTOSE) 40 % oral gel 15 g PRN   dextrose 50 % solution 12.5 g PRN   glucagon (rDNA) injection 1 mg PRN   dextrose 5 % solution PRN   insulin lispro (HUMALOG) injection pen 0-6 Units TID WC   insulin lispro (HUMALOG) injection pen 0-3 Units Nightly   Pharmacy is holding home medication.  Please  before patient is discharged  PRN       Objective:  BP (!) 139/90   Pulse 92   Temp 97.6 °F (36.4 °C)   Resp 16   Ht 5' 9\" (1.753 m)   Wt (!) 327 lb 12.8 oz (148.7 kg)   SpO2 97%   BMI 48.41 kg/m²   No intake or output data in the 24 hours ending 12/17/18 1626 Wt Readings from Last 3 Encounters:   12/15/18 (!) 327 lb 12.8 oz (148.7 kg)   05/10/18 300 lb (136.1 kg)

## 2018-12-18 PROBLEM — G40.909 SEIZURE DISORDER (HCC): Status: ACTIVE | Noted: 2018-12-14

## 2018-12-18 LAB
ANION GAP SERPL CALCULATED.3IONS-SCNC: 10 MMOL/L (ref 3–16)
BASOPHILS ABSOLUTE: 0.1 K/UL (ref 0–0.2)
BASOPHILS RELATIVE PERCENT: 0.4 %
BUN BLDV-MCNC: 15 MG/DL (ref 7–20)
CALCIUM SERPL-MCNC: 9.2 MG/DL (ref 8.3–10.6)
CHLORIDE BLD-SCNC: 111 MMOL/L (ref 99–110)
CO2: 22 MMOL/L (ref 21–32)
CREAT SERPL-MCNC: 0.7 MG/DL (ref 0.6–1.1)
CULTURE, BLOOD 2: ABNORMAL
EOSINOPHILS ABSOLUTE: 0.2 K/UL (ref 0–0.6)
EOSINOPHILS RELATIVE PERCENT: 1.8 %
GFR AFRICAN AMERICAN: >60
GFR NON-AFRICAN AMERICAN: >60
GLUCOSE BLD-MCNC: 117 MG/DL (ref 70–99)
GLUCOSE BLD-MCNC: 121 MG/DL (ref 70–99)
GLUCOSE BLD-MCNC: 189 MG/DL (ref 70–99)
GLUCOSE BLD-MCNC: 194 MG/DL (ref 70–99)
GLUCOSE BLD-MCNC: 207 MG/DL (ref 70–99)
HCT VFR BLD CALC: 46.2 % (ref 36–48)
HEMOGLOBIN: 15 G/DL (ref 12–16)
LEFT VENTRICULAR EJECTION FRACTION HIGH VALUE: 55 %
LEFT VENTRICULAR EJECTION FRACTION MODE: NORMAL
LV EF: 55 %
LVEF MODALITY: NORMAL
LYMPHOCYTES ABSOLUTE: 4.4 K/UL (ref 1–5.1)
LYMPHOCYTES RELATIVE PERCENT: 32.9 %
MCH RBC QN AUTO: 29.2 PG (ref 26–34)
MCHC RBC AUTO-ENTMCNC: 32.4 G/DL (ref 31–36)
MCV RBC AUTO: 90.1 FL (ref 80–100)
MONOCYTES ABSOLUTE: 0.9 K/UL (ref 0–1.3)
MONOCYTES RELATIVE PERCENT: 6.5 %
NEUTROPHILS ABSOLUTE: 7.7 K/UL (ref 1.7–7.7)
NEUTROPHILS RELATIVE PERCENT: 58.4 %
ORGANISM: ABNORMAL
ORGANISM: ABNORMAL
PDW BLD-RTO: 13.8 % (ref 12.4–15.4)
PERFORMED ON: ABNORMAL
PLATELET # BLD: 232 K/UL (ref 135–450)
PMV BLD AUTO: 7.8 FL (ref 5–10.5)
POTASSIUM REFLEX MAGNESIUM: 4.4 MMOL/L (ref 3.5–5.1)
RBC # BLD: 5.12 M/UL (ref 4–5.2)
SODIUM BLD-SCNC: 143 MMOL/L (ref 136–145)
VANCOMYCIN TROUGH: 11.7 UG/ML (ref 10–20)
WBC # BLD: 13.3 K/UL (ref 4–11)

## 2018-12-18 PROCEDURE — 36415 COLL VENOUS BLD VENIPUNCTURE: CPT

## 2018-12-18 PROCEDURE — 2580000003 HC RX 258: Performed by: INTERNAL MEDICINE

## 2018-12-18 PROCEDURE — 6360000002 HC RX W HCPCS: Performed by: INTERNAL MEDICINE

## 2018-12-18 PROCEDURE — 86701 HIV-1ANTIBODY: CPT

## 2018-12-18 PROCEDURE — 6370000000 HC RX 637 (ALT 250 FOR IP): Performed by: HOSPITALIST

## 2018-12-18 PROCEDURE — 85025 COMPLETE CBC W/AUTO DIFF WBC: CPT

## 2018-12-18 PROCEDURE — 80202 ASSAY OF VANCOMYCIN: CPT

## 2018-12-18 PROCEDURE — 2060000000 HC ICU INTERMEDIATE R&B

## 2018-12-18 PROCEDURE — 99223 1ST HOSP IP/OBS HIGH 75: CPT | Performed by: INTERNAL MEDICINE

## 2018-12-18 PROCEDURE — 80048 BASIC METABOLIC PNL TOTAL CA: CPT

## 2018-12-18 PROCEDURE — 86702 HIV-2 ANTIBODY: CPT

## 2018-12-18 PROCEDURE — 2580000003 HC RX 258: Performed by: HOSPITALIST

## 2018-12-18 PROCEDURE — 93306 TTE W/DOPPLER COMPLETE: CPT

## 2018-12-18 PROCEDURE — 87390 HIV-1 AG IA: CPT

## 2018-12-18 RX ADMIN — LEVOTHYROXINE SODIUM 75 MCG: 75 TABLET ORAL at 08:22

## 2018-12-18 RX ADMIN — INSULIN LISPRO 1 UNITS: 100 INJECTION, SOLUTION INTRAVENOUS; SUBCUTANEOUS at 17:42

## 2018-12-18 RX ADMIN — DEXTROSE MONOHYDRATE 100 ML/HR: 50 INJECTION, SOLUTION INTRAVENOUS at 08:22

## 2018-12-18 RX ADMIN — TOPIRAMATE 100 MG: 100 TABLET, FILM COATED ORAL at 08:22

## 2018-12-18 RX ADMIN — Medication 10 ML: at 21:57

## 2018-12-18 RX ADMIN — VANCOMYCIN HYDROCHLORIDE 1750 MG: 10 INJECTION, POWDER, LYOPHILIZED, FOR SOLUTION INTRAVENOUS at 08:24

## 2018-12-18 RX ADMIN — CLOZAPINE 300 MG: 100 TABLET ORAL at 08:22

## 2018-12-18 RX ADMIN — INSULIN LISPRO 1 UNITS: 100 INJECTION, SOLUTION INTRAVENOUS; SUBCUTANEOUS at 21:57

## 2018-12-18 RX ADMIN — ATORVASTATIN CALCIUM 10 MG: 10 TABLET, FILM COATED ORAL at 08:22

## 2018-12-18 RX ADMIN — TOPIRAMATE 100 MG: 100 TABLET, FILM COATED ORAL at 21:57

## 2018-12-18 RX ADMIN — Medication 10 ML: at 08:26

## 2018-12-18 RX ADMIN — RIVAROXABAN 20 MG: 20 TABLET, FILM COATED ORAL at 17:42

## 2018-12-18 RX ADMIN — BENZTROPINE MESYLATE 1 MG: 1 TABLET ORAL at 21:57

## 2018-12-18 ASSESSMENT — ENCOUNTER SYMPTOMS
STRIDOR: 0
COLOR CHANGE: 0
CHEST TIGHTNESS: 0
EYE DISCHARGE: 0
TROUBLE SWALLOWING: 0
CHOKING: 0
DIARRHEA: 0
BLOOD IN STOOL: 0
SHORTNESS OF BREATH: 0
COUGH: 0
ABDOMINAL PAIN: 0
PHOTOPHOBIA: 0
APNEA: 0
RHINORRHEA: 0
EYE REDNESS: 0
NAUSEA: 0
FACIAL SWELLING: 0

## 2018-12-18 ASSESSMENT — PAIN SCALES - GENERAL: PAINLEVEL_OUTOF10: 0

## 2018-12-18 NOTE — PROGRESS NOTES
Trachea midline, no adenopathy. Cardiovascular: Regular rhythm, normal S1, S2. No murmur. No edema in lower extremities  Respiratory: Not using accessory muscles. Good inspiratory effort. Clear to auscultation bilaterally, no wheeze or crackles. GI: Abdomen soft, no tenderness, not distended, normal bowel sounds  Musculoskeletal: No cyanosis in digits, neck supple  Neurology: CN 2-12 grossly intact. No speech or motor deficits  Psych: Normal affect. Alert and oriented in time, place and person  Skin: Warm, dry, normal turgor  Extremity exam shows brisk capillary refill. Peripheral pulses are palpable in lower extremities     Labs and Tests:  CBC:   Recent Labs      12/17/18   0503  12/18/18   0544   WBC  13.0*  13.3*   HGB  13.4  15.0   PLT  231  232     BMP:    Recent Labs      12/18/18   0544   NA  143   K  4.4   CL  111*   CO2  22   BUN  15   CREATININE  0.7   GLUCOSE  121*     Hepatic: No results for input(s): AST, ALT, ALB, BILITOT, ALKPHOS in the last 72 hours. MRI BRAIN WO CONTRAST   Final Result   Unremarkable noncontrast MRI of the brain. CT Head WO Contrast   Final Result   1. Mild periventricular and subcortical white matter hypoattenuation which is   advanced and nonspecific in a patient of this age. Consider the possibility   of a demyelinating process. Further evaluation with MRI brain would be   beneficial to evaluate the white matter. 2. No clear evidence for acute intracranial hemorrhage or midline shift. XR CHEST PORTABLE   Final Result   Mild to moderate pulmonary vascular congestion, mildly increased. Low lung volumes.       Stable cardiomegaly                Problem List  Active Problems:    Diabetes education, encounter for    Mental retardation    Bipolar affective disorder ([facility]    Hypothyroidism    PTSD (post-traumatic stress disorder)    Seizure disorder ([facility]    Altered mental status    Abnormal brain CT    Diabetes mellitus type 2 in obese (HCC)    Diarrhea

## 2018-12-18 NOTE — CONSULTS
tablet, Take 1 mg by mouth nightly  fluPHENAZine decanoate (PROLIXIN) 25 MG/ML injection, Inject 62.5 mg into the muscle every 14 days  clindamycin (CLINDAGEL) 1 % gel, Apply topically 2 times daily Apply topically 2 times daily. cloZAPine (CLOZARIL) 100 MG tablet, Take 300 mg by mouth daily   lisinopril (PRINIVIL;ZESTRIL) 20 MG tablet, Take 20 mg by mouth daily  hydrOXYzine (VISTARIL) 50 MG capsule, Take 50 mg by mouth 3 times daily as needed for Itching  Liraglutide (VICTOZA) 18 MG/3ML SOPN SC injection, Take 1.2 mg by mouth daily   rivaroxaban (XARELTO) 20 MG TABS tablet, Take 20 mg by mouth Daily with supper   sitaGLIPtin (JANUVIA) 100 MG tablet, Take 100 mg by mouth daily  topiramate (TOPAMAX) 25 MG tablet, Take 100 mg by mouth 2 times daily   mupirocin (BACTROBAN) 2 % ointment, Apply topically 3 times daily Apply topically 3 times daily. LORazepam (ATIVAN) 1 MG tablet, Take 1 mg by mouth daily. .  folic acid (FOLVITE) 1 MG tablet, Take 3 mg by mouth daily   metformin (GLUCOPHAGE-XR) 500 MG XR tablet, Take 2 tablets by mouth 2 times daily (with meals) for 30 days.  vancomycin  1,750 mg Intravenous Q12H    atorvastatin  10 mg Oral Daily    benztropine  1 mg Oral Nightly    cloZAPine  300 mg Oral Daily    levothyroxine  75 mcg Oral Daily    Liraglutide  1.2 mg Subcutaneous Daily    rivaroxaban  20 mg Oral Dinner    topiramate  100 mg Oral BID    sodium chloride flush  10 mL Intravenous 2 times per day    insulin lispro  0-6 Units Subcutaneous TID WC    insulin lispro  0-3 Units Subcutaneous Nightly       Current antibiotics: All antibiotics and their doses were reviewed by me    Recent Abx Admin                   vancomycin (VANCOCIN) 1,750 mg in dextrose 5 % 500 mL IVPB (mg) 1,750 mg New Bag 12/18/18 0824     1,750 mg New Bag 12/17/18 1812                   REVIEW OF SYSTEMS:       Review of Systems   Constitutional: Positive for fatigue.  Negative for chills, diaphoresis and unexpected weight hypoattenuation which is   advanced and nonspecific in a patient of this age. Consider the possibility   of a demyelinating process. Further evaluation with MRI brain would be   beneficial to evaluate the white matter. 2. No clear evidence for acute intracranial hemorrhage or midline shift. XR CHEST PORTABLE   Final Result   Mild to moderate pulmonary vascular congestion, mildly increased. Low lung volumes. Stable cardiomegaly             Outside records:    Labs, Microbiology, Radiology and pertinent results from Care everywhere, if available, were reviewed as a part ofthe consultation. Known drug Allergies: All allergies were reviewed and updated    Allergies   Allergen Reactions    Other      Possible anesthesia reaction, agent unknown       Microbiology: I have reviewed allavailable micro lab data and cultures    · Blood culture (1/2) - collected on 12/14/2018: Coagulase negative Staphylococcus          Problems addressed today:       Patient Active Problem List   Diagnosis Code    Diabetes education, encounter for Z71.89    Mental retardation F79    Bipolar affective disorder ([facility] F31.9    Hypothyroidism E03.9    Bipolar 1 disorder, depressed ([address] F31.9    Anorexia nervosa F50.00    PTSD (post-traumatic stress disorder) F43.10    Seizure disorder ([address] G40.909    Altered mental status R41.82    Abnormal brain CT R90.89    Diabetes mellitus type 2 in obese ([address] E11.69, E66.9    Diarrhea R19.7    Leukocytosis D72.829    History of pulmonary embolism Z86.711    Obesity, Class III, BMI 40-49.9 (morbid obesity) ([address] E66.01         Assessment:     The patient is a 33 y.o. old female who  has a past medical history of Arthritis; Asthma; Bipolar affective disorder ([address] Blood circulation, collateral; Borderline personality disorder ([address] Diabetes mellitus ([address] GERD (gastroesophageal reflux disease); Mental retardation; Obesity;  Other disorders of kidney and ureter; electronic signature. Thank you for involving me in the care of your patient. I will continue to follow. If you have anyadditional questions, please do not hesitate to contact me.     Ena Quiñonez MD, MPH  12/18/2018, 11:13 AM  [facility] Infectious Disease   Office: [phone]  Fax: [phone]  Tuesday AM clinic:   [address] [address]  Thursday AM clinic: [address] [facility]

## 2018-12-18 NOTE — PLAN OF CARE
Problem: Falls - Risk of:  Goal: Will remain free from falls  Will remain free from falls    Pt free from falls at this time, Fall precautions in place, bed in lowest position with brakes locked, call light within reach, will continue to monitor.

## 2018-12-18 NOTE — PLAN OF CARE
Problem: Falls - Risk of:  Goal: Will remain free from falls  Will remain free from falls    Outcome: Ongoing  Patient is a low fall risk. Patient free of falls this shift. Bed low and locked at all times. Call light and bedside table is within reach. Discussed with patient the need to call out for assistance before getting up when if needed. Patient verbalized understanding.

## 2018-12-19 VITALS
RESPIRATION RATE: 18 BRPM | HEIGHT: 69 IN | DIASTOLIC BLOOD PRESSURE: 78 MMHG | OXYGEN SATURATION: 98 % | WEIGHT: 293 LBS | SYSTOLIC BLOOD PRESSURE: 139 MMHG | TEMPERATURE: 97.8 F | HEART RATE: 101 BPM | BODY MASS INDEX: 43.4 KG/M2

## 2018-12-19 LAB
ANION GAP SERPL CALCULATED.3IONS-SCNC: 9 MMOL/L (ref 3–16)
BASOPHILS ABSOLUTE: 0.1 K/UL (ref 0–0.2)
BASOPHILS RELATIVE PERCENT: 0.8 %
BLOOD CULTURE, ROUTINE: NORMAL
BUN BLDV-MCNC: 17 MG/DL (ref 7–20)
CALCIUM SERPL-MCNC: 9 MG/DL (ref 8.3–10.6)
CHLORIDE BLD-SCNC: 111 MMOL/L (ref 99–110)
CO2: 20 MMOL/L (ref 21–32)
CREAT SERPL-MCNC: 0.7 MG/DL (ref 0.6–1.1)
EOSINOPHILS ABSOLUTE: 0.2 K/UL (ref 0–0.6)
EOSINOPHILS RELATIVE PERCENT: 2 %
GFR AFRICAN AMERICAN: >60
GFR NON-AFRICAN AMERICAN: >60
GLUCOSE BLD-MCNC: 116 MG/DL (ref 70–99)
GLUCOSE BLD-MCNC: 134 MG/DL (ref 70–99)
GLUCOSE BLD-MCNC: 144 MG/DL (ref 70–99)
HCT VFR BLD CALC: 42.3 % (ref 36–48)
HEMOGLOBIN: 13.6 G/DL (ref 12–16)
HIV AG/AB: NORMAL
HIV ANTIGEN: NORMAL
HIV-1 ANTIBODY: NORMAL
HIV-2 AB: NORMAL
LYMPHOCYTES ABSOLUTE: 3.7 K/UL (ref 1–5.1)
LYMPHOCYTES RELATIVE PERCENT: 30.3 %
MCH RBC QN AUTO: 28.8 PG (ref 26–34)
MCHC RBC AUTO-ENTMCNC: 32.1 G/DL (ref 31–36)
MCV RBC AUTO: 89.5 FL (ref 80–100)
MONOCYTES ABSOLUTE: 0.7 K/UL (ref 0–1.3)
MONOCYTES RELATIVE PERCENT: 6.2 %
NEUTROPHILS ABSOLUTE: 7.3 K/UL (ref 1.7–7.7)
NEUTROPHILS RELATIVE PERCENT: 60.7 %
PDW BLD-RTO: 14 % (ref 12.4–15.4)
PERFORMED ON: ABNORMAL
PERFORMED ON: ABNORMAL
PLATELET # BLD: 235 K/UL (ref 135–450)
PMV BLD AUTO: 7.7 FL (ref 5–10.5)
POTASSIUM REFLEX MAGNESIUM: 4.3 MMOL/L (ref 3.5–5.1)
RBC # BLD: 4.73 M/UL (ref 4–5.2)
SODIUM BLD-SCNC: 140 MMOL/L (ref 136–145)
WBC # BLD: 12.1 K/UL (ref 4–11)

## 2018-12-19 PROCEDURE — 6370000000 HC RX 637 (ALT 250 FOR IP): Performed by: HOSPITALIST

## 2018-12-19 PROCEDURE — 85025 COMPLETE CBC W/AUTO DIFF WBC: CPT

## 2018-12-19 PROCEDURE — 80048 BASIC METABOLIC PNL TOTAL CA: CPT

## 2018-12-19 PROCEDURE — 36415 COLL VENOUS BLD VENIPUNCTURE: CPT

## 2018-12-19 PROCEDURE — 2580000003 HC RX 258: Performed by: HOSPITALIST

## 2018-12-19 PROCEDURE — 99232 SBSQ HOSP IP/OBS MODERATE 35: CPT | Performed by: INTERNAL MEDICINE

## 2018-12-19 RX ORDER — LISINOPRIL 5 MG/1
5 TABLET ORAL DAILY
Status: DISCONTINUED | OUTPATIENT
Start: 2018-12-19 | End: 2018-12-19 | Stop reason: HOSPADM

## 2018-12-19 RX ORDER — LISINOPRIL 5 MG/1
5 TABLET ORAL DAILY
Qty: 30 TABLET | Refills: 3 | Status: SHIPPED | OUTPATIENT
Start: 2018-12-19

## 2018-12-19 RX ADMIN — ATORVASTATIN CALCIUM 10 MG: 10 TABLET, FILM COATED ORAL at 09:58

## 2018-12-19 RX ADMIN — LISINOPRIL 5 MG: 5 TABLET ORAL at 13:10

## 2018-12-19 RX ADMIN — CLOZAPINE 300 MG: 100 TABLET ORAL at 09:58

## 2018-12-19 RX ADMIN — TOPIRAMATE 100 MG: 100 TABLET, FILM COATED ORAL at 09:58

## 2018-12-19 RX ADMIN — Medication 10 ML: at 10:01

## 2018-12-19 RX ADMIN — INSULIN LISPRO 1 UNITS: 100 INJECTION, SOLUTION INTRAVENOUS; SUBCUTANEOUS at 13:06

## 2018-12-19 RX ADMIN — LEVOTHYROXINE SODIUM 75 MCG: 75 TABLET ORAL at 06:41

## 2018-12-19 ASSESSMENT — PAIN SCALES - GENERAL
PAINLEVEL_OUTOF10: 0
PAINLEVEL_OUTOF10: 0

## 2018-12-19 ASSESSMENT — ENCOUNTER SYMPTOMS
TROUBLE SWALLOWING: 0
BLOOD IN STOOL: 0
COUGH: 0
APNEA: 0
STRIDOR: 0
FACIAL SWELLING: 0
RHINORRHEA: 0
ABDOMINAL PAIN: 0
PHOTOPHOBIA: 0
CHOKING: 0
NAUSEA: 0
EYE DISCHARGE: 0
EYE REDNESS: 0
CHEST TIGHTNESS: 0
COLOR CHANGE: 0
DIARRHEA: 0
SHORTNESS OF BREATH: 0

## 2018-12-19 NOTE — CARE COORDINATION
Spoke with RN at group home and pt either needs sliding scale or Victoza shot once per day. MD paged.  Ara Tuttle, MSW, LSW

## 2018-12-19 NOTE — CARE COORDINATION
Faxed AVS to [facility]. Patient discharged 12/19 to [address] [place] [address]. All discharge needs met per case management.  Electronically signed by BHAVIN Herrera, ANATOLY on 12/19/2018 at 2:33 PM

## 2018-12-19 NOTE — DISCHARGE SUMMARY
strength  · how much to take        CONTINUE taking these medications    Acidophilus 100 MG Caps     atorvastatin 10 MG tablet  Commonly known as:  LIPITOR     benztropine 1 MG tablet  Commonly known as:  COGENTIN     cloZAPine 100 MG tablet  Commonly known as:  CLOZARIL     fluPHENAZine decanoate 25 MG/ML injection  Commonly known as:  PROLIXIN     folic acid 1 MG tablet  Commonly known as:  FOLVITE     hydrOXYzine 50 MG capsule  Commonly known as:  VISTARIL     levothyroxine 75 MCG tablet  Commonly known as:  SYNTHROID     LORazepam 1 MG tablet  Commonly known as:  ATIVAN     PROBIOTIC-10 Caps     rivaroxaban 20 MG Tabs tablet  Commonly known as:  XARELTO     SITagliptin 100 MG tablet  Commonly known as:  JANUVIA     topiramate 25 MG tablet  Commonly known as:  TOPAMAX     traZODone 100 MG tablet  Commonly known as:  DESYREL     TRINTELLIX 20 MG Tabs tablet  Generic drug:  VORTIoxetine HBr     VICTOZA 18 MG/3ML Sopn SC injection  Generic drug:  Liraglutide        STOP taking these medications    clindamycin 1 % gel  Commonly known as:  CLINDAGEL     DEPO-PROVERA 150 MG/ML injection  Generic drug:  medroxyPROGESTERone     EFFEXOR  MG extended release capsule  Generic drug:  venlafaxine     enoxaparin 150 MG/ML injection  Commonly known as:  LOVENOX     HYDROcodone-acetaminophen 7.5-325 MG per tablet  Commonly known as:  NORCO     metFORMIN 500 MG extended release tablet  Commonly known as:  GLUCOPHAGE-XR     mupirocin 2 % ointment  Commonly known as:  BACTROBAN     warfarin 7.5 MG tablet  Commonly known as:  COUMADIN           Where to Get Your Medications      These medications were sent to [address] [phone] [address] [phone]  [address] [facility] [address]    Phone:  [phone]   · insulin lispro 100 UNIT/ML pen  · lisinopril 5 MG tablet         Discharge recommendations given to patient. Follow Up. in 1 week   Disposition. home  Activity.  ambulate in house  Diet: DIET GENERAL;      Spent 23  minutes in discharge process.     Signed:  Franky Scott MD     12/19/2018 10:57 AM

## 2018-12-19 NOTE — FLOWSHEET NOTE
Dr. Eduarda Neves called to inquire on patient's readiness to discharge. Dr. Eduarda Neves stated that patient is ok to discharge and if group home has any problems for them to send her back.

## 2018-12-19 NOTE — PROGRESS NOTES
exhibits no discharge. No scleral icterus. Neck: Normal range of motion. Neck supple. Cardiovascular: Normal rate and regular rhythm. Exam reveals no friction rub. No murmur heard. Pulmonary/Chest: Effort normal. No stridor. She has no wheezes. She has no rales. She exhibits no tenderness. Abdominal: Soft. She exhibits no mass. There is no tenderness. There is no rebound and no guarding. Musculoskeletal: She exhibits no edema or tenderness. Lymphadenopathy:     She has no cervical adenopathy. Neurological: She is alert and oriented to person, place, and time. She exhibits normal muscle tone. Skin: Skin is warm and dry. No rash noted. She is not diaphoretic. No erythema. Psychiatric: She has a normal mood and affect. Judgment normal.   Nursing note and vitals reviewed. Lines: Allvascular access sites are healthy with no local erythema, discharge or tenderness. Intake and output:    No intake/output data recorded. Lab Data:   All available labs and old records have been reviewed by me. CBC:  Recent Labs      12/17/18   0503  12/18/18   0544  12/19/18   0445   WBC  13.0*  13.3*  12.1*   RBC  4.66  5.12  4.73   HGB  13.4  15.0  13.6   HCT  41.2  46.2  42.3   PLT  231  232  235   MCV  88.4  90.1  89.5   MCH  28.8  29.2  28.8   MCHC  32.6  32.4  32.1   RDW  13.9  13.8  14.0        BMP:  Recent Labs      12/18/18   0544  12/19/18   0445   NA  143  140   K  4.4  4.3   CL  111*  111*   CO2  22  20*   BUN  15  17   CREATININE  0.7  0.7   CALCIUM  9.2  9.0   GLUCOSE  121*  134*        Hepatic Function Panel:   Lab Results   Component Value Date    ALKPHOS 86 12/14/2018    ALT 24 12/14/2018    AST 16 12/14/2018    PROT 8.2 12/14/2018    PROT 7.5 09/20/2012    BILITOT <0.2 12/14/2018    LABALBU 4.3 12/14/2018       CPK: No results found for: CKTOTAL  ESR: No results found for: SEDRATE  CRP: No results found for: CRP        Imaging:     All pertinent images and reports for the current visitwere 12/14/2018 : Negative so far      Problems addressed today:       Patient Active Problem List   Diagnosis Code    Diabetes education, encounter for Z71.89    Mental retardation F79    Bipolar affective disorder ([facility] F31.9    Hypothyroidism E03.9    Bipolar 1 disorder, depressed ([address] F31.9    Anorexia nervosa F50.00    PTSD (post-traumatic stress disorder) F43.10    Seizure disorder ([address] G40.909    Altered mental status R41.82    Abnormal brain CT R90.89    Diabetes mellitus type 2 in obese ([address] E11.69, E66.9    Diarrhea R19.7    Leukocytosis D72.829    History of pulmonary embolism Z86.711    Obesity, Class III, BMI 40-49.9 (morbid obesity) ([facility] E66.01    Episode of unresponsiveness R41.89    Diabetic peripheral neuropathy (HCC) E11.42         Assessment:     The patient is a 33 y.o. old female who  has a past medical history of Arthritis; Asthma; Bipolar affective disorder ([facility] Blood circulation, collateral; Borderline personality disorder ([address] Diabetes mellitus ([address] Diabetic peripheral neuropathy ([facility] [address] GERD (gastroesophageal reflux disease); Mental retardation; Obesity; Other disorders of kidney and ureter; Pulmonary embolism ([address] Seizures ([address] and Thyroid disease. with following problems:    · Dizziness on admission - improving  · Persistent leukocytosis - nonspecific in nature  · Persistent diarrhea  - resolved  · Positive blood culture for coag was negative Staphylococcus  · Type 2 diabetes mellitus in obese - diabetes counseling done  · Seizure disorder  · History of pulmonary embolism  · History of bipolar disorder  · Group home resident  · Obesity Class 3 due to excess calorie intake : Body mass index is 48.81 kg/m². - Discussed importance of weight loss      Discussion:      I had stopped her IV vancomycin yesterday. Coag is negative Staphylococcus is likely contaminant from the skin. The patient is afebrile. WBC count is 12,100 today. EG reviewed. Was unremarkable. using self-help programs like Weight Watchers®, Overeaters Anonymous®, and Take Off Pounds Sensibly (TOPS)© , following DASH diet and increasing exercise or walking briskly daily for half hour to and hour 5-7 days a week was suggested among other measures. Information was given about various weight loss education programs and their websites like www.cdc.gov/healthyweight, www.choosemyplate.gov and www.health.gov/dietaryguidelines/    TIME SPENT TODAY:     - Spent over  27  minutes on visit (including interval history, physical exam, review of data including labs, cultures, imaging, development and implementation of treatment plan and coordination of complex care). - Over 50% of time spent with patient face to face on counseling and education. Please note that this chart wasgenerated using Dragon dictation software. Although every effort was made to ensure the accuracy of this automated transcription, some errors in transcription may have occurred inadvertently. If you may need anyclarification, please do not hesitate to contact me through EPIC or at the phone number provided below with my electronic signature. Thank you for involving me in the care of your patient. Iwill continue to follow. If you have any additional questions, please do not hesitate to contact me.     Maddi Tam MD, MPH  12/19/2018, 12:07 PM  [facility] Infectious Disease   Office: [phone]  Fax: [phone]  Tuesday AM clinic:   [address] [place] [address] 120  Thursday AM [id number]

## 2018-12-19 NOTE — PROGRESS NOTES
disorder)    Seizure disorder (HCC)    Altered mental status    Abnormal brain CT    Diabetes mellitus type 2 in obese (HCC)    Diarrhea    Leukocytosis    History of pulmonary embolism    Obesity, Class III, BMI 40-49.9 (morbid obesity) ([facility]    Episode of unresponsiveness    Diabetic peripheral neuropathy ([facility]  Resolved Problems:    * No resolved hospital problems. *       Assessment & Plan:       Acute metabolic encephalopathy: Resolved: MRI brain / EEG nonacute:  [facility]  neurology    Coagulase-negative staph on one blood culture out of 2 : probably contaminant:   Apt ID input  Leukocytosis ; resolving;  [facility] ID as OP   Morbid  obesity: Lifestyle changes  Bipolar disorder, PTSD: Continue antipsychotics:  Follows up with psych at group  home  History of mental tradition: Follow PCP  Dyslipidemia: Continue statin  HTN ; cont lisinopril   Diabetes mellitus type 2: Continue SSI ;   Hypothyroid: Continue replacement  Chronic anticoagulation: Continue Xarelto  History of pulmonary embolism  DVT prophylaxis: On anticoagulation  Disposition: Group home if OK with ID   Follow-up: PCP,Neurology,  psych at group home , ID   iNck Vargas MD   12/19/2018 10:45 AM

## 2018-12-19 NOTE — DISCHARGE INSTR - COC
Diabetes mellitus type 2 in obese ([facility]) E11.69, E66.9    Diarrhea R19.7    Leukocytosis D72.829    History of pulmonary embolism Z86.711    Obesity, Class III, BMI 40-49.9 (morbid obesity) ([facility]) E66.01    Episode of unresponsiveness R41.89    Diabetic peripheral neuropathy ([facility]) E11.42       Isolation/Infection:   Isolation          No Isolation            Nurse Assessment:  Last Vital Signs: /78   Pulse 101   Temp 97.8 °F (36.6 °C) (Temporal)   Resp 18   Ht 5' 9\" (1.753 m)   Wt (!) 330 lb 14.4 oz (150.1 kg)   SpO2 98%   BMI 48.87 kg/m²     Last documented pain score (0-10 scale): Pain Level: 0  Last Weight:   Wt Readings from Last 1 Encounters:   12/19/18 (!) 330 lb 14.4 oz (150.1 kg)     Mental Status:  {IP PT MENTAL STATUS:69017}    IV Access:  { RENATE IV ACCESS:032177519}    Nursing Mobility/ADLs:  Walking   {CHP DME HDXY:139223101}  Transfer  {CHP DME HQIC:247658968}  Bathing  {CHP DME EIKZ:564296399}  Dressing  {CHP DME QESJ:726558757}  Toileting  {CHP DME QVVB:853725597}  Feeding  {CHP DME WPXT:933223840}  Med Admin  {CHP DME QKTT:821742513}  Med Delivery   { RENATE MED Delivery:490144951}    Wound Care Documentation and Therapy:  Incision 09/23/14 Breast Left;Right (Active)   Number of days: 1548       Incision 09/23/14 Coccyx (Active)   Number of days: 7147        Elimination:  Continence:   · Bowel: {YES / AN:97117}  · Bladder: {YES / IS:32026}  Urinary Catheter: {Urinary Catheter:271353070}   Colostomy/Ileostomy/Ileal Conduit: {YES / SK:81822}       Date of Last BM: ***  No intake or output data in the 24 hours ending 12/19/18 1049  No intake/output data recorded.     Safety Concerns:     [employer] Safety Concerns:258603946}    Impairments/Disabilities:      [employer] Impairments/Disabilities:459234685}    Nutrition Therapy:  Current Nutrition Therapy:   [address] Diet List:958057467}    Routes of Feeding: {CHP DME Other Feedings:770259771}  Liquids: {Slp liquid thickness:92655}  Daily Fluid